# Patient Record
Sex: FEMALE | Race: BLACK OR AFRICAN AMERICAN | NOT HISPANIC OR LATINO | Employment: FULL TIME | URBAN - METROPOLITAN AREA
[De-identification: names, ages, dates, MRNs, and addresses within clinical notes are randomized per-mention and may not be internally consistent; named-entity substitution may affect disease eponyms.]

---

## 2023-12-30 ENCOUNTER — APPOINTMENT (OUTPATIENT)
Dept: GENERAL RADIOLOGY | Facility: HOSPITAL | Age: 43
End: 2023-12-30
Payer: COMMERCIAL

## 2023-12-30 VITALS
RESPIRATION RATE: 16 BRPM | WEIGHT: 128.31 LBS | BODY MASS INDEX: 22.73 KG/M2 | HEART RATE: 83 BPM | OXYGEN SATURATION: 97 % | HEIGHT: 63 IN | TEMPERATURE: 98.2 F | DIASTOLIC BLOOD PRESSURE: 120 MMHG | SYSTOLIC BLOOD PRESSURE: 193 MMHG

## 2023-12-30 PROCEDURE — 99282 EMERGENCY DEPT VISIT SF MDM: CPT

## 2023-12-30 PROCEDURE — 73140 X-RAY EXAM OF FINGER(S): CPT

## 2023-12-31 ENCOUNTER — HOSPITAL ENCOUNTER (EMERGENCY)
Facility: HOSPITAL | Age: 43
Discharge: HOME | End: 2023-12-31
Attending: EMERGENCY MEDICINE
Payer: COMMERCIAL

## 2023-12-31 DIAGNOSIS — S61.215A LACERATION OF LEFT RING FINGER WITHOUT FOREIGN BODY WITHOUT DAMAGE TO NAIL, INITIAL ENCOUNTER: Primary | ICD-10-CM

## 2023-12-31 PROCEDURE — 25010000002 TETANUS-DIPHTH-ACELL PERTUSSIS 5-2.5-18.5 LF-MCG/0.5 SUSPENSION PREFILLED SYRINGE: Performed by: NURSE PRACTITIONER

## 2023-12-31 PROCEDURE — 90471 IMMUNIZATION ADMIN: CPT | Performed by: NURSE PRACTITIONER

## 2023-12-31 PROCEDURE — 90715 TDAP VACCINE 7 YRS/> IM: CPT | Performed by: NURSE PRACTITIONER

## 2023-12-31 RX ADMIN — TETANUS TOXOID, REDUCED DIPHTHERIA TOXOID AND ACELLULAR PERTUSSIS VACCINE, ADSORBED 0.5 ML: 5; 2.5; 8; 8; 2.5 SUSPENSION INTRAMUSCULAR at 01:36

## 2023-12-31 NOTE — ED TRIAGE NOTES
Pt comes into the ER tonight for a left ring finger laceration. Pt states she was trying to catch a wine glass that was falling off the counter and cut her finer open. Bleeding is controlled in triage.

## 2023-12-31 NOTE — ED PROVIDER NOTES
"Time: 11:09 PM EST  Date of encounter:  12/30/2023  Independent Historian/Clinical History and Information was obtained by:   Patient    History is limited by: N/A    Chief Complaint   Patient presents with    Finger Laceration         History of Present Illness:  Patient is a 43 y.o. year old female who presents to the emergency department for evaluation of laceration of the left fourth finger.  The patient was attempting to grab a wine glass that had been knocked over by a falling picture frame and believes she may have grabbed it after the frame had shattered it.  No numbness tingling or weakness.  Unknown tetanus status.  (BHARATI Garcia, provider in triage)     Patient Care Team  Primary Care Provider: Provider, No Known    Past Medical History:     No Known Allergies  History reviewed. No pertinent past medical history.  History reviewed. No pertinent surgical history.  History reviewed. No pertinent family history.    Home Medications:  Prior to Admission medications    Not on File        Social History:          Review of Systems:  Review of Systems   Gastrointestinal:  Negative for vomiting.   Skin:  Positive for wound (Finger laceration).   Neurological:  Negative for weakness and numbness.   Psychiatric/Behavioral:  The patient is nervous/anxious.    All other systems reviewed and are negative.       Physical Exam:  BP (!) 193/120 (BP Location: Right arm, Patient Position: Sitting)   Pulse 83   Temp 98.2 °F (36.8 °C) (Oral)   Resp 16   Ht 160 cm (63\")   Wt 58.2 kg (128 lb 4.9 oz)   LMP 12/02/2023 (Approximate)   SpO2 97%   BMI 22.73 kg/m²         Physical Exam  Vitals and nursing note reviewed.   HENT:      Head: Normocephalic.      Mouth/Throat:      Mouth: Mucous membranes are moist.   Eyes:      Conjunctiva/sclera: Conjunctivae normal.   Cardiovascular:      Pulses: Normal pulses.   Pulmonary:      Effort: Pulmonary effort is normal.   Abdominal:      General: There is no distension. "   Musculoskeletal:         General: Tenderness (At wound site) present. Injury: Laceration left fourth finger.Normal range of motion.      Cervical back: Normal range of motion.   Skin:     General: Skin is warm.      Capillary Refill: Capillary refill takes less than 2 seconds.      Findings: Laceration present.      Comments: 1.5 cm laceration to the volar aspect of the mid fourth proximal phalanx  With some skin avulsion   Neurological:      General: No focal deficit present.      Mental Status: She is alert and oriented to person, place, and time.      Sensory: No sensory deficit.      Motor: No weakness.   Psychiatric:         Mood and Affect: Mood normal.         Behavior: Behavior normal.                Procedures:  Laceration Repair    Date/Time: 12/31/2023 1:43 AM    Performed by: Genevieve Donovan APRN  Authorized by: Gildardo Johnson MD    Consent:     Consent obtained:  Verbal    Consent given by:  Patient    Risks, benefits, and alternatives were discussed: yes      Risks discussed:  Infection, pain, poor cosmetic result, poor wound healing, nerve damage, retained foreign body, tendon damage and vascular damage    Alternatives discussed:  No treatment  Universal protocol:     Procedure explained and questions answered to patient or proxy's satisfaction: yes      Imaging studies available: yes      Patient identity confirmed:  Verbally with patient  Anesthesia:     Anesthesia method:  Nerve block    Block anesthetic:  Lidocaine 1% w/o epi    Block technique:  Ring    Block injection procedure:  Anatomic landmarks identified, introduced needle, negative aspiration for blood and incremental injection    Block outcome:  Anesthesia achieved  Laceration details:     Length (cm):  1.5    Depth (mm):  2  Pre-procedure details:     Preparation:  Patient was prepped and draped in usual sterile fashion and imaging obtained to evaluate for foreign bodies  Exploration:     Hemostasis achieved with:  Direct pressure  and tourniquet    Imaging outcome: foreign body not noted      Wound exploration: entire depth of wound visualized      Wound extent: no foreign bodies/material noted, no tendon damage noted and no underlying fracture noted      Contaminated: no    Treatment:     Area cleansed with:  Povidone-iodine    Amount of cleaning:  Standard    Irrigation solution:  Sterile saline    Irrigation volume:  200    Irrigation method:  Syringe  Skin repair:     Repair method:  Sutures    Suture size:  4-0    Suture material:  Nylon    Suture technique:  Simple interrupted    Number of sutures:  3  Approximation:     Approximation:  Close  Repair type:     Repair type:  Simple  Post-procedure details:     Dressing:  Non-adherent dressing    Procedure completion:  Tolerated well, no immediate complications        Medical Decision Making:      Comorbidities that affect care:    None    External Notes reviewed:    None      The following orders were placed and all results were independently analyzed by me:  Orders Placed This Encounter   Procedures    Laceration Repair    XR Finger 2+ View Left    Wound Dressing       Medications Given in the Emergency Department:  Medications   Tetanus-Diphth-Acell Pertussis (BOOSTRIX) injection 0.5 mL (0.5 mL Intramuscular Given 12/31/23 0136)        ED Course:    The patient was initially evaluated in the triage area where orders were placed. The patient was later dispositioned by BHARATI Romeo.      The patient was advised to stay for completion of workup which includes but is not limited to communication of labs and radiological results, reassessment and plan. The patient was advised that leaving prior to disposition by a provider could result in critical findings that are not communicated to the patient.     ED Course as of 12/31/23 0218   Sun Dec 31, 2023   0123 XR Finger 2+ View Left  No acute findings and no foreign body retention [DS]      ED Course User Index  [DS] Genevieve Donovan APRN        Labs:    Lab Results (last 24 hours)       ** No results found for the last 24 hours. **             Imaging:    XR Finger 2+ View Left    Result Date: 12/31/2023  PROCEDURE: XR FINGER 2+ VW LEFT  COMPARISON: None.  INDICATIONS: laceration; possible foreign body 4th digit  FINDINGS: 3 views of the 4th digit of the left hand were obtained.  No acute fracture or acute malalignment is identified. External artifacts obscure detail.  No retained radiopaque foreign body is appreciated.  If symptoms or clinical concerns persist, consider imaging follow-up.       No acute fracture or acute malalignment is identified.  No retained radiopaque foreign body is appreciated.     Please note that portions of this note were completed with a voice recognition program.  JAI ELKINS JR, MD       Electronically Signed and Approved By: JAI ELKINS JR, MD on 12/31/2023 at 0:48                 Differential Diagnosis and Discussion:      Laceration: Laceration was evaluated for arterial injury, ligamentous damage, and other neurovascular injury.        MDM  Number of Diagnoses or Management Options  Laceration of left ring finger without foreign body without damage to nail, initial encounter  Diagnosis management comments: The patient presented with a laceration in need of repair. See laceration repair note for details. The wound was irrigated with copious normal saline irrigation. 3 sutures were used to approximate the wound edges. Tetanus was given. The patient tolerated the procedure well. Acute bleeding has ceased and the wound was approximated in the emergency department. Patient was counseled to keep the wound clean, dry, and out of the sun. Patient was counseled to change dressings daily. Patient was advised to return to the ED for worsening erythema, pain, swelling, fever, excessive drainage or signs of infection. They were counseled to follow up for suture removal as described in the discharge instructions. Patient  verbalizes understanding and agrees to follow up as instructed.        Amount and/or Complexity of Data Reviewed  Tests in the radiology section of CPT®: reviewed and ordered  Tests in the medicine section of CPT®: ordered and reviewed    Risk of Complications, Morbidity, and/or Mortality  Presenting problems: low  Diagnostic procedures: low  Management options: low    Patient Progress  Patient progress: stable           Patient Care Considerations:    NARCOTICS: I considered prescribing opiate pain medication as an outpatient, however no bony abnormality      Consultants/Shared Management Plan:    None    Social Determinants of Health:    Patient has presented with family members who are responsible, reliable and will ensure follow up care.      Disposition and Care Coordination:    Discharged: The patient is suitable and stable for discharge with no need for consideration of observation or admission.    I have explained the patient´s condition, diagnoses and treatment plan based on the information available to me at this time. I have answered questions and addressed any concerns. The patient has a good  understanding of the patient´s diagnosis, condition, and treatment plan as can be expected at this point. The vital signs have been stable. The patient´s condition is stable and appropriate for discharge from the emergency department.      The patient will pursue further outpatient evaluation with the primary care physician or other designated or consulting physician as outlined in the discharge instructions. They are agreeable to this plan of care and follow-up instructions have been explained in detail. The patient has received these instructions in written format and have expressed an understanding of the discharge instructions. The patient is aware that any significant change in condition or worsening of symptoms should prompt an immediate return to this or the closest emergency department or call to  911.    Final diagnoses:   Laceration of left ring finger without foreign body without damage to nail, initial encounter        ED Disposition       ED Disposition   Discharge    Condition   Stable    Comment   --               This medical record created using voice recognition software.             Genevieve Donovan, APRN  12/31/23 021

## 2023-12-31 NOTE — DISCHARGE INSTRUCTIONS
Keep wound clean and dry.  Wash with soap and water and pat dry.    Sutures out in 7 to 10 days.  Follow-up with your PCP when you return home back to [place].    Tylenol/ Motrin for pain    Ice and elevation as needed    Return for new or worsening symptoms